# Patient Record
Sex: MALE | Race: WHITE
[De-identification: names, ages, dates, MRNs, and addresses within clinical notes are randomized per-mention and may not be internally consistent; named-entity substitution may affect disease eponyms.]

---

## 2022-05-26 ENCOUNTER — APPOINTMENT (OUTPATIENT)
Dept: SURGERY | Facility: CLINIC | Age: 37
End: 2022-05-26
Payer: COMMERCIAL

## 2022-05-26 VITALS
BODY MASS INDEX: 36.03 KG/M2 | SYSTOLIC BLOOD PRESSURE: 131 MMHG | DIASTOLIC BLOOD PRESSURE: 84 MMHG | HEART RATE: 98 BPM | HEIGHT: 72 IN | WEIGHT: 266 LBS | OXYGEN SATURATION: 99 % | TEMPERATURE: 98.1 F

## 2022-05-26 VITALS
TEMPERATURE: 98.1 F | OXYGEN SATURATION: 98 % | SYSTOLIC BLOOD PRESSURE: 130 MMHG | WEIGHT: 266 LBS | HEIGHT: 74 IN | DIASTOLIC BLOOD PRESSURE: 84 MMHG | HEART RATE: 98 BPM | BODY MASS INDEX: 34.14 KG/M2

## 2022-05-26 DIAGNOSIS — Z86.39 PERSONAL HISTORY OF OTHER ENDOCRINE, NUTRITIONAL AND METABOLIC DISEASE: ICD-10-CM

## 2022-05-26 DIAGNOSIS — F17.200 NICOTINE DEPENDENCE, UNSPECIFIED, UNCOMPLICATED: ICD-10-CM

## 2022-05-26 DIAGNOSIS — E66.9 OBESITY, UNSPECIFIED: ICD-10-CM

## 2022-05-26 PROBLEM — Z00.00 ENCOUNTER FOR PREVENTIVE HEALTH EXAMINATION: Status: ACTIVE | Noted: 2022-05-26

## 2022-05-26 PROCEDURE — ZZZZZ: CPT

## 2022-05-26 PROCEDURE — 99213 OFFICE O/P EST LOW 20 MIN: CPT | Mod: 25

## 2022-05-26 PROCEDURE — 21501 I&D DP ABSC/HMTMA SFT TS NCK: CPT | Mod: 59

## 2022-05-26 RX ORDER — AMOXICILLIN AND CLAVULANATE POTASSIUM 875; 125 MG/1; MG/1
875-125 TABLET, COATED ORAL
Qty: 14 | Refills: 0 | Status: ACTIVE | COMMUNITY
Start: 2022-05-26 | End: 1900-01-01

## 2022-05-26 NOTE — HISTORY OF PRESENT ILLNESS
[de-identified] : pt sent over from Dr Brock with a back abscess [de-identified] : 37 year white male, NIDDM, c/o a back abscess for the past few weeks. Pt states he has had pain and swelling and was seen by his PMD and placed on antibiotics with limited improvement. He has has chills without temps. No drainage from the area.

## 2022-05-26 NOTE — CONSULT LETTER
[Dear  ___] : Dear  [unfilled], [Sincerely,] : Sincerely, [FreeTextEntry1] : I saw Ritchie Pugh in the office today.  He is a 37-year-old white male complaining of an abscess in his right posterior shoulder region.  Its been present for over 10 days.  It causes mild discomfort when touched.\par \par Past medical history is significant for non-insulin-dependent diabetes and hypercholesterolemia.  Past surgical history is significant for epidural steroid injections for bulging disc in his back.  He has a 13-pack-year history of smoking and quit smoking in 2014.  He socially drinks alcohol.  His present medications include rosuvastatin, metformin, ezetimibe, Januvia, and Bactrim.  He has no known drug allergies.  Parents both are alive and have a history of diabetes.  A review of systems was performed and documented.\par \par On Physical Exam:  General: No acute distress, conversant, well-nourished.  Eyes: PERRLA, EOMI, anicteric.  Conjunctiva are noninjected and moist.  Vision is grossly intact.  ENT: Hearing is grossly intact.  There is no nasal discharge.  Ears and nose are symmetrical and atraumatic.  Nasal, oral, and oral pharyngeal mucosa are pink and moist with no evidence of ulceration.  Head/neck: Head is normocephalic.  Neck is supple.  Carotids have good upstroke.  Trachea is in the midline.  No thyromegaly.  Respiratory: Lungs are clear to auscultation with good inspiratory effort.  No rales, rhonchi or wheezing.  Cardiovascular: Heart is regular in rate and rhythm with no murmurs.  Abdomen: Obese, soft, and nontender.  Good bowel sounds present in all 4 quadrants.  No guarding, no rebound, and no peritoneal signs.  No evidence of hepatosplenomegaly.  No evidence of abdominal wall hernias.  Inguinal regions are unremarkable with no evidence of hernias.  Lymphatics: There is no evidence of masses, or lymphadenopathy in the head, neck, trunk, axillary, supraclavicular, or inguinal regions.  Extremities: Extremities reveal no gross deformities, good range of motion, no evidence of edema, no varicosities, no lymphadenopathy and peripheral pulses are intact.  Skin: Good color, turgor, texture with no gross lesions, no eruptions or rashes, no subcutaneous nodules and normal temperature.  Psychiatric: Awake, alert, and oriented x3 with an appropriate affect.\par \par Pertinent physical findings: Large fluctuant abscess right posterior shoulder/back region.\par \par Impression: Large fluctuant abscess right posterior shoulder/back region.\par \par Plan: I have discussed the case with Dr. Brooks Mcarthur who i is covering me while I am out of town.  Patient was sent directly to his office. [FreeTextEntry3] : Jayjay Brock D.O., HEMANT, FACS\par , [facility]\par  [facility]

## 2022-05-26 NOTE — PHYSICAL EXAM
[JVD] : no jugular venous distention  [Normal Thyroid] : the thyroid was normal [Carotid Bruits] : no carotid bruits [Normal Breath Sounds] : Normal breath sounds [Normal Heart Sounds] : normal heart sounds [Normal Rate and Rhythm] : normal rate and rhythm [Purpura] : purpura [Petechiae] : no petechiae [Skin Ulcer] : no ulcer [Skin Induration] : induration [Alert] : alert [Oriented to Person] : oriented to person [Oriented to Place] : oriented to place [Oriented to Time] : oriented to time [Calm] : calm [de-identified] : well developed male in pain [de-identified] : noinjected and nonicteric [de-identified] : without adenopathy [de-identified] : with a large back abscess- approximately 12 by 12 cm [de-identified] : benign

## 2022-05-26 NOTE — REVIEW OF SYSTEMS
[Fever] : no fever [Chills] : chills [Feeling Poorly] : not feeling poorly [Feeling Tired] : not feeling tired [Recent Weight Gain (___ Lbs)] : no recent weight gain [Recent Weight Loss (___ Lbs)] : no recent weight loss [Constipation] : constipation [Negative] : Heme/Lymph

## 2022-05-26 NOTE — ASSESSMENT
[FreeTextEntry1] : pt had an incision and drainage of a large back abscess with greater that 100 cc of green pus. The incision was irrigated and packed with iodoform packing and a DSD was placed.

## 2022-06-02 ENCOUNTER — APPOINTMENT (OUTPATIENT)
Dept: SURGERY | Facility: CLINIC | Age: 37
End: 2022-06-02
Payer: COMMERCIAL

## 2022-06-02 VITALS — TEMPERATURE: 98.6 F

## 2022-06-02 PROCEDURE — 99024 POSTOP FOLLOW-UP VISIT: CPT

## 2022-06-02 NOTE — PHYSICAL EXAM
[Normal Breath Sounds] : Normal breath sounds [Normal Heart Sounds] : normal heart sounds [Normal Rate and Rhythm] : normal rate and rhythm [Calm] : calm [de-identified] : well developed male in no distress [de-identified] : benign [de-identified] : back incision reopened and with small amount of drainage

## 2022-06-02 NOTE — HISTORY OF PRESENT ILLNESS
[de-identified] : pt sent over from Dr Brock with a back abscess [de-identified] : pt improved, decreased pain and swelling, Denies any fevers or chills.

## 2022-06-13 ENCOUNTER — APPOINTMENT (OUTPATIENT)
Dept: SURGERY | Facility: CLINIC | Age: 37
End: 2022-06-13

## 2022-06-13 VITALS — TEMPERATURE: 97.3 F

## 2022-06-13 PROCEDURE — 99024 POSTOP FOLLOW-UP VISIT: CPT

## 2022-06-13 NOTE — HISTORY OF PRESENT ILLNESS
[de-identified] : pt sent over from Dr Brock with a back abscess, s/p incision and drainage on 5/26/22 [de-identified] : Pt improved with less drainage and no fevers or chills. Also with less pain

## 2022-06-13 NOTE — PHYSICAL EXAM
[Normal Heart Sounds] : normal heart sounds [Normal Breath Sounds] : Normal breath sounds [Normal Rate and Rhythm] : normal rate and rhythm [Calm] : calm [de-identified] : without adenopathy [de-identified] : well developed male in no distress [de-identified] : benign [de-identified] : back incision reopened with serous drainage.

## 2022-07-11 ENCOUNTER — APPOINTMENT (OUTPATIENT)
Dept: SURGERY | Facility: CLINIC | Age: 37
End: 2022-07-11

## 2022-07-11 VITALS — TEMPERATURE: 97.7 F

## 2022-07-11 DIAGNOSIS — L02.212 CUTANEOUS ABSCESS OF BACK [ANY PART, EXCEPT BUTTOCK]: ICD-10-CM

## 2022-07-11 PROCEDURE — 99024 POSTOP FOLLOW-UP VISIT: CPT

## 2022-07-11 NOTE — HISTORY OF PRESENT ILLNESS
[de-identified] : pt sent over from Dr Brock with a back abscess, s/p incision and drainage on 5/26/22 [de-identified] : pt improved, drainage has stopped, He denies any fevers or chills.

## 2022-07-11 NOTE — PHYSICAL EXAM
[Normal Breath Sounds] : Normal breath sounds [Normal Heart Sounds] : normal heart sounds [Normal Rate and Rhythm] : normal rate and rhythm [Calm] : calm [de-identified] : well developed male in no distress [de-identified] : without adenopathy [de-identified] : benign [de-identified] : back incision clean and closed, without erythema or swelling